# Patient Record
Sex: FEMALE | Race: BLACK OR AFRICAN AMERICAN | NOT HISPANIC OR LATINO | URBAN - METROPOLITAN AREA
[De-identification: names, ages, dates, MRNs, and addresses within clinical notes are randomized per-mention and may not be internally consistent; named-entity substitution may affect disease eponyms.]

---

## 2022-01-19 ENCOUNTER — INPATIENT (INPATIENT)
Facility: HOSPITAL | Age: 32
LOS: 0 days | Discharge: HOME | End: 2022-01-20
Attending: OBSTETRICS & GYNECOLOGY | Admitting: OBSTETRICS & GYNECOLOGY
Payer: COMMERCIAL

## 2022-01-19 VITALS
DIASTOLIC BLOOD PRESSURE: 76 MMHG | RESPIRATION RATE: 18 BRPM | SYSTOLIC BLOOD PRESSURE: 124 MMHG | TEMPERATURE: 98 F | HEART RATE: 96 BPM

## 2022-01-19 DIAGNOSIS — O26.893 OTHER SPECIFIED PREGNANCY RELATED CONDITIONS, THIRD TRIMESTER: ICD-10-CM

## 2022-01-19 DIAGNOSIS — O47.1 FALSE LABOR AT OR AFTER 37 COMPLETED WEEKS OF GESTATION: ICD-10-CM

## 2022-01-19 LAB
ABO RH CONFIRMATION: SIGNIFICANT CHANGE UP
BASOPHILS # BLD AUTO: 0.03 K/UL — SIGNIFICANT CHANGE UP (ref 0–0.2)
BASOPHILS NFR BLD AUTO: 0.3 % — SIGNIFICANT CHANGE UP (ref 0–2)
BLD GP AB SCN SERPL QL: SIGNIFICANT CHANGE UP
COVID-19 SPIKE DOMAIN AB INTERP: POSITIVE
COVID-19 SPIKE DOMAIN ANTIBODY RESULT: >250 U/ML — HIGH
EOSINOPHIL # BLD AUTO: 0.04 K/UL — SIGNIFICANT CHANGE UP (ref 0–0.5)
EOSINOPHIL NFR BLD AUTO: 0.4 % — SIGNIFICANT CHANGE UP (ref 0–6)
HBV SURFACE AB SER-ACNC: SIGNIFICANT CHANGE UP
HCT VFR BLD CALC: 39.9 % — SIGNIFICANT CHANGE UP (ref 34.5–45)
HGB BLD-MCNC: 13.8 G/DL — SIGNIFICANT CHANGE UP (ref 11.5–15.5)
HIV 1 & 2 AB SERPL IA.RAPID: SIGNIFICANT CHANGE UP
HIV 1+2 AB+HIV1 P24 AG SERPL QL IA: SIGNIFICANT CHANGE UP
IMM GRANULOCYTES NFR BLD AUTO: 1.1 % — SIGNIFICANT CHANGE UP (ref 0–1.5)
LYMPHOCYTES # BLD AUTO: 2.7 K/UL — SIGNIFICANT CHANGE UP (ref 1–3.3)
LYMPHOCYTES # BLD AUTO: 29.9 % — SIGNIFICANT CHANGE UP (ref 13–44)
MCHC RBC-ENTMCNC: 31.2 PG — SIGNIFICANT CHANGE UP (ref 27–34)
MCHC RBC-ENTMCNC: 34.6 GM/DL — SIGNIFICANT CHANGE UP (ref 32–36)
MCV RBC AUTO: 90.3 FL — SIGNIFICANT CHANGE UP (ref 80–100)
MEV IGG SER-ACNC: <5 AU/ML — SIGNIFICANT CHANGE UP
MEV IGG+IGM SER-IMP: NEGATIVE — SIGNIFICANT CHANGE UP
MONOCYTES # BLD AUTO: 0.75 K/UL — SIGNIFICANT CHANGE UP (ref 0–0.9)
MONOCYTES NFR BLD AUTO: 8.3 % — SIGNIFICANT CHANGE UP (ref 2–14)
NEUTROPHILS # BLD AUTO: 5.42 K/UL — SIGNIFICANT CHANGE UP (ref 1.8–7.4)
NEUTROPHILS NFR BLD AUTO: 60 % — SIGNIFICANT CHANGE UP (ref 43–77)
PLATELET # BLD AUTO: 153 K/UL — SIGNIFICANT CHANGE UP (ref 150–400)
RAPID RVP RESULT: SIGNIFICANT CHANGE UP
RBC # BLD: 4.42 M/UL — SIGNIFICANT CHANGE UP (ref 3.8–5.2)
RBC # FLD: 14.6 % — HIGH (ref 10.3–14.5)
RUBV IGG SER-ACNC: 3.7 INDEX — SIGNIFICANT CHANGE UP
RUBV IGG SER-IMP: POSITIVE — SIGNIFICANT CHANGE UP
SARS-COV-2 IGG+IGM SERPL QL IA: >250 U/ML — HIGH
SARS-COV-2 IGG+IGM SERPL QL IA: POSITIVE
SARS-COV-2 RNA SPEC QL NAA+PROBE: SIGNIFICANT CHANGE UP
WBC # BLD: 9.04 K/UL — SIGNIFICANT CHANGE UP (ref 3.8–10.5)
WBC # FLD AUTO: 9.04 K/UL — SIGNIFICANT CHANGE UP (ref 3.8–10.5)

## 2022-01-19 RX ORDER — IBUPROFEN 200 MG
600 TABLET ORAL EVERY 6 HOURS
Refills: 0 | Status: COMPLETED | OUTPATIENT
Start: 2022-01-19 | End: 2022-12-18

## 2022-01-19 RX ORDER — HYDROCORTISONE 1 %
1 OINTMENT (GRAM) TOPICAL EVERY 6 HOURS
Refills: 0 | Status: DISCONTINUED | OUTPATIENT
Start: 2022-01-19 | End: 2022-01-20

## 2022-01-19 RX ORDER — IBUPROFEN 200 MG
600 TABLET ORAL EVERY 6 HOURS
Refills: 0 | Status: DISCONTINUED | OUTPATIENT
Start: 2022-01-19 | End: 2022-01-20

## 2022-01-19 RX ORDER — OXYTOCIN 10 UNIT/ML
333.33 VIAL (ML) INJECTION
Qty: 20 | Refills: 0 | Status: DISCONTINUED | OUTPATIENT
Start: 2022-01-19 | End: 2022-01-20

## 2022-01-19 RX ORDER — IBUPROFEN 200 MG
1 TABLET ORAL
Qty: 40 | Refills: 0
Start: 2022-01-19 | End: 2022-01-28

## 2022-01-19 RX ORDER — SIMETHICONE 80 MG/1
80 TABLET, CHEWABLE ORAL EVERY 4 HOURS
Refills: 0 | Status: DISCONTINUED | OUTPATIENT
Start: 2022-01-19 | End: 2022-01-20

## 2022-01-19 RX ORDER — OXYCODONE HYDROCHLORIDE 5 MG/1
5 TABLET ORAL ONCE
Refills: 0 | Status: DISCONTINUED | OUTPATIENT
Start: 2022-01-19 | End: 2022-01-20

## 2022-01-19 RX ORDER — ACETAMINOPHEN 500 MG
975 TABLET ORAL
Refills: 0 | Status: DISCONTINUED | OUTPATIENT
Start: 2022-01-19 | End: 2022-01-20

## 2022-01-19 RX ORDER — BENZOCAINE 10 %
1 GEL (GRAM) MUCOUS MEMBRANE EVERY 6 HOURS
Refills: 0 | Status: DISCONTINUED | OUTPATIENT
Start: 2022-01-19 | End: 2022-01-20

## 2022-01-19 RX ORDER — LANOLIN
1 OINTMENT (GRAM) TOPICAL EVERY 6 HOURS
Refills: 0 | Status: DISCONTINUED | OUTPATIENT
Start: 2022-01-19 | End: 2022-01-20

## 2022-01-19 RX ORDER — DIBUCAINE 1 %
1 OINTMENT (GRAM) RECTAL EVERY 6 HOURS
Refills: 0 | Status: DISCONTINUED | OUTPATIENT
Start: 2022-01-19 | End: 2022-01-20

## 2022-01-19 RX ORDER — KETOROLAC TROMETHAMINE 30 MG/ML
30 SYRINGE (ML) INJECTION ONCE
Refills: 0 | Status: DISCONTINUED | OUTPATIENT
Start: 2022-01-19 | End: 2022-01-19

## 2022-01-19 RX ORDER — OXYTOCIN 10 UNIT/ML
333.33 VIAL (ML) INJECTION
Qty: 20 | Refills: 0 | Status: DISCONTINUED | OUTPATIENT
Start: 2022-01-19 | End: 2022-01-19

## 2022-01-19 RX ORDER — PRAMOXINE HYDROCHLORIDE 150 MG/15G
1 AEROSOL, FOAM RECTAL EVERY 4 HOURS
Refills: 0 | Status: DISCONTINUED | OUTPATIENT
Start: 2022-01-19 | End: 2022-01-20

## 2022-01-19 RX ORDER — OXYCODONE HYDROCHLORIDE 5 MG/1
5 TABLET ORAL
Refills: 0 | Status: DISCONTINUED | OUTPATIENT
Start: 2022-01-19 | End: 2022-01-20

## 2022-01-19 RX ORDER — AER TRAVELER 0.5 G/1
1 SOLUTION RECTAL; TOPICAL EVERY 4 HOURS
Refills: 0 | Status: DISCONTINUED | OUTPATIENT
Start: 2022-01-19 | End: 2022-01-20

## 2022-01-19 RX ORDER — MAGNESIUM HYDROXIDE 400 MG/1
30 TABLET, CHEWABLE ORAL
Refills: 0 | Status: DISCONTINUED | OUTPATIENT
Start: 2022-01-19 | End: 2022-01-20

## 2022-01-19 RX ORDER — SODIUM CHLORIDE 9 MG/ML
1000 INJECTION, SOLUTION INTRAVENOUS
Refills: 0 | Status: DISCONTINUED | OUTPATIENT
Start: 2022-01-19 | End: 2022-01-19

## 2022-01-19 RX ORDER — DIPHENHYDRAMINE HCL 50 MG
25 CAPSULE ORAL EVERY 6 HOURS
Refills: 0 | Status: DISCONTINUED | OUTPATIENT
Start: 2022-01-19 | End: 2022-01-20

## 2022-01-19 RX ORDER — SODIUM CHLORIDE 9 MG/ML
3 INJECTION INTRAMUSCULAR; INTRAVENOUS; SUBCUTANEOUS EVERY 8 HOURS
Refills: 0 | Status: DISCONTINUED | OUTPATIENT
Start: 2022-01-19 | End: 2022-01-20

## 2022-01-19 RX ORDER — ACETAMINOPHEN 500 MG
2 TABLET ORAL
Qty: 60 | Refills: 0
Start: 2022-01-19 | End: 2022-01-28

## 2022-01-19 RX ORDER — CITRIC ACID/SODIUM CITRATE 300-500 MG
30 SOLUTION, ORAL ORAL ONCE
Refills: 0 | Status: DISCONTINUED | OUTPATIENT
Start: 2022-01-19 | End: 2022-01-19

## 2022-01-19 RX ORDER — TETANUS TOXOID, REDUCED DIPHTHERIA TOXOID AND ACELLULAR PERTUSSIS VACCINE, ADSORBED 5; 2.5; 8; 8; 2.5 [IU]/.5ML; [IU]/.5ML; UG/.5ML; UG/.5ML; UG/.5ML
0.5 SUSPENSION INTRAMUSCULAR ONCE
Refills: 0 | Status: DISCONTINUED | OUTPATIENT
Start: 2022-01-19 | End: 2022-01-20

## 2022-01-19 RX ADMIN — Medication 975 MILLIGRAM(S): at 21:47

## 2022-01-19 RX ADMIN — Medication 975 MILLIGRAM(S): at 09:07

## 2022-01-19 RX ADMIN — Medication 1000 MILLIUNIT(S)/MIN: at 08:18

## 2022-01-19 RX ADMIN — Medication 600 MILLIGRAM(S): at 23:39

## 2022-01-19 RX ADMIN — Medication 30 MILLIGRAM(S): at 08:30

## 2022-01-19 RX ADMIN — Medication 600 MILLIGRAM(S): at 12:30

## 2022-01-19 RX ADMIN — Medication 600 MILLIGRAM(S): at 18:07

## 2022-01-19 RX ADMIN — SODIUM CHLORIDE 3 MILLILITER(S): 9 INJECTION INTRAMUSCULAR; INTRAVENOUS; SUBCUTANEOUS at 23:06

## 2022-01-19 RX ADMIN — Medication 30 MILLIGRAM(S): at 08:45

## 2022-01-19 RX ADMIN — Medication 975 MILLIGRAM(S): at 15:08

## 2022-01-19 RX ADMIN — Medication 975 MILLIGRAM(S): at 10:07

## 2022-01-19 RX ADMIN — SODIUM CHLORIDE 125 MILLILITER(S): 9 INJECTION, SOLUTION INTRAVENOUS at 07:30

## 2022-01-19 RX ADMIN — Medication 1 TABLET(S): at 12:30

## 2022-01-19 NOTE — OB PROVIDER DELIVERY SUMMARY - NSPROVIDERDELIVERYNOTE_OBGYN_ALL_OB_FT
Pt C/O pressure and urge to push with contractions at approx 0804. AROM performed, clear fluid. On exam, cervix FD/100% effaced.  After pushing for 10 minutes head delivered OA at 0814.  No nuchal cord noted. Bulb suctioned mouth and nares at perineum.  Body spontaneously delivered and delayed cord clamping x 30sec while bulb suctioning mouth.  Vigorous cry immediately after delivery and  pink.  Cord clamped x 2 and cut and baby to mother shortly thereafter, skin-to-skin initiated.  Segment of cord clamped x 2 and cut for umbilical cord blood gases and blood type.  Placenta delivered intact at 5:27am.  3-vessel cord, no gross pathology.  Left periurethral laceration repaired with 3-0 chromic suture. Excellent hemostasis.  EBL 236cc.  No complications.  Weir: male, APGAR 9/9, weight 3510g 7lb 12oz.  Baby and mother stable. Pt C/O pressure and urge to push with contractions at approx 0804. AROM performed, clear fluid. On exam, cervix FD/100% effaced.  After pushing for 10 minutes head delivered direct OA at 0814.  No nuchal cord noted. Bulb suctioned mouth and nares at perineum.  Body spontaneously delivered and delayed cord clamping x 30sec while bulb suctioning mouth.  Vigorous cry immediately after delivery and  pink.  Cord clamped x 2 and cut and baby to mother shortly thereafter, skin-to-skin initiated.  Segment of cord clamped x 2 and cut for umbilical cord blood gases and blood type.  Placenta delivered intact at 5:27am.  3-vessel cord, no gross pathology.  Left periurethral laceration repaired with 3-0 chromic suture. Excellent hemostasis.  EBL 236cc.  No complications.  : male, APGAR 9/9, weight 3510g 7lb 12oz.  Baby and mother stable.

## 2022-01-19 NOTE — OB PROVIDER DELIVERY SUMMARY - NSSELHIDDEN_OBGYN_ALL_OB_FT
[NS_DeliveryAttending1_OBGYN_ALL_OB_FT:[id number]==],[NS_DeliveryAssist1_OBGYN_ALL_OB_FT:[id number]=]

## 2022-01-19 NOTE — DISCHARGE NOTE OB - PROVIDER TOKENS
FREE:[LAST:[Provider],PHONE:[(   )    -],FAX:[(   )    -],ADDRESS:[Please follow up with your provider.]]

## 2022-01-19 NOTE — OB RN DELIVERY SUMMARY - NSSELHIDDEN_OBGYN_ALL_OB_FT
[NS_DeliveryAttending1_OBGYN_ALL_OB_FT:[id number]==],[NS_DeliveryAssist1_OBGYN_ALL_OB_FT:[id number]],[NS_DeliveryRN_OBGYN_ALL_OB_FT:[id number]=]

## 2022-01-19 NOTE — DISCHARGE NOTE OB - CARE PROVIDER_API CALL
Provider,   Please follow up with your provider.  Phone: (   )    -  Fax: (   )    -  Follow Up Time:

## 2022-01-19 NOTE — DISCHARGE NOTE OB - NS MD DC FALL RISK RISK
For information on Fall & Injury Prevention, visit: [email or web address] OR  [email or web address] OR  https://www.cdc.gov/steadi/patient.html

## 2022-01-19 NOTE — OB PROVIDER H&P - HISTORY OF PRESENT ILLNESS
31y  at 39.3 weeks GA by LMP who presents to L&D for contractions since 9pm last night, now increasing in frequency and intensity q 4min. Patient denies vaginal bleeding, leakage of fluid. She endorses good fetal movement. Denies fevers, chills, nausea, vomiting, chest pain, SOB, dizziness and headache. No other complaints at this time.     MARCE: 24    Prenatal course uncomplicated.      POB:  x 1, FT uncomplicated   PGYN: +fibroids, -ovarian cysts, denies STD hx, denies abnormal PAPs   PMH: Denies  PSH: Denies  SH: Denies EtOH, tobacco and illicit drug use during this pregnancy; feels safe at home   Meds: PNVs  Allergies: NKDA

## 2022-01-19 NOTE — OB RN PATIENT PROFILE - FALL HARM RISK - UNIVERSAL INTERVENTIONS
Bed in lowest position, wheels locked, appropriate side rails in place/Call bell, personal items and telephone in reach/Instruct patient to call for assistance before getting out of bed or chair/Non-slip footwear when patient is out of bed/[place] to call system/Physically safe environment - no spills, clutter or unnecessary equipment/Purposeful Proactive Rounding/Room/bathroom lighting operational, light cord in reach

## 2022-01-19 NOTE — OB PROVIDER H&P - ASSESSMENT
A/P: 30 yo  at 39.3 weeks admitted in labor  -Admit to L&D  -Consent  -Admission labs  -NPO, except ice chips   -IV fluids  -Labor: Intact, active labor  -Fetus: Cat I tracing. Continuous toco and fetal monitoring.   -GBS: unknown, no GBS ppx required as pt is 39wks    Discussed with Dr. Marlow   A/P: 30 yo  at 39.3 weeks admitted in labor  -Admit to L&D  -Consent  -Admission labs  -NPO, except ice chips   -IV fluids  -Labor: Intact, active labor  -Fetus: Cat I tracing. Continuous toco and fetal monitoring.   -GBS: found to be GBS positive on records received from [facility] after delivery of infant    Discussed with Dr. Marlow

## 2022-01-19 NOTE — DISCHARGE NOTE OB - MEDICATION SUMMARY - MEDICATIONS TO TAKE
I will START or STAY ON the medications listed below when I get home from the hospital:    ibuprofen 600 mg oral tablet  -- 1 tab(s) by mouth every 6 hours   -- Do not take this drug if you are pregnant.  It is very important that you take or use this exactly as directed.  Do not skip doses or discontinue unless directed by your doctor.  May cause drowsiness or dizziness.  Obtain medical advice before taking any non-prescription drugs as some may affect the action of this medication.  Take with food or milk.    -- Indication: For pain    Tylenol 325 mg oral tablet  -- 2 tab(s) by mouth every 8 hours   -- This product contains acetaminophen.  Do not use  with any other product containing acetaminophen to prevent possible liver damage.    -- Indication: For pain

## 2022-01-19 NOTE — OB PROVIDER H&P - NSHPPHYSICALEXAM_GEN_ALL_CORE
T(C): 36.6 (01-19-22 @ 06:46), Max: 36.6 (01-19-22 @ 06:46)  HR: 96 (01-19-22 @ 06:53) (96 - 96)  BP: 124/76 (01-19-22 @ 06:53) (124/76 - 124/76)  RR: 18 (01-19-22 @ 06:46) (18 - 18)      Gen: NAD, well-appearing, AAOx3   Abd: Soft, gravid  Ext: non-tender, non-edematous  SVE:  8.5/100/-1, intact  Bedside sono: vertex  FHT: 130bpm  [place]: contraction q 2-4 min

## 2022-01-19 NOTE — DISCHARGE NOTE OB - PATIENT PORTAL LINK FT
You can access the FollowMyHealth Patient Portal offered by [facility] by registering at the following website: [email or web address]. By joining [employer]’s FollowMyHealth portal, you will also be able to view your health information using other applications (apps) compatible with our system.

## 2022-01-19 NOTE — OB RN DELIVERY SUMMARY - NS_SEPSISRSKCALC_OBGYN_ALL_OB_FT
EOS calculated successfully. EOS Risk Factor: 0.5/1000 live births ([facility] national incidence); GA=39w3d; Temp=98.4; ROM=0.233; GBS='Unknown'; Antibiotics='No antibiotics or any antibiotics < 2 hrs prior to birth'

## 2022-01-19 NOTE — OB RN TRIAGE NOTE - FALL HARM RISK - UNIVERSAL INTERVENTIONS
Bed in lowest position, wheels locked, appropriate side rails in place/Call bell, personal items and telephone in reach/Instruct patient to call for assistance before getting out of bed or chair/Non-slip footwear when patient is out of bed/[place] to call system/Physically safe environment - no spills, clutter or unnecessary equipment/Purposeful Proactive Rounding/Room/bathroom lighting operational, light cord in reach

## 2022-01-19 NOTE — DISCHARGE NOTE OB - CARE PLAN
Principal Discharge DX:	Vaginal delivery  Assessment and plan of treatment:	1) Please take Ibuprofen as needed for pain control  2) Nothing in the vagina for 6 weeks (including no sex, no tampons, and no douching).  3) Please call your doctor for a follow up your postpartum appointment in 4-6 weeks.  4) Please continue taking vitamins postpartum. Take iron and colace for acute blood loss anemia.  5) Please call the office sooner if you have heavy vaginal bleeding, severe abdominal pain, or fever > 100.4F.  6) You may resume regular activity as tolerated   1

## 2022-01-20 VITALS
DIASTOLIC BLOOD PRESSURE: 68 MMHG | HEART RATE: 77 BPM | SYSTOLIC BLOOD PRESSURE: 115 MMHG | TEMPERATURE: 98 F | RESPIRATION RATE: 18 BRPM | OXYGEN SATURATION: 98 %

## 2022-01-20 LAB
HCT VFR BLD CALC: 34 % — LOW (ref 34.5–45)
HGB BLD-MCNC: 11.7 G/DL — SIGNIFICANT CHANGE UP (ref 11.5–15.5)
T PALLIDUM AB TITR SER: NEGATIVE — SIGNIFICANT CHANGE UP

## 2022-01-20 PROCEDURE — 86706 HEP B SURFACE ANTIBODY: CPT

## 2022-01-20 PROCEDURE — 0225U NFCT DS DNA&RNA 21 SARSCOV2: CPT

## 2022-01-20 PROCEDURE — 59050 FETAL MONITOR W/REPORT: CPT

## 2022-01-20 PROCEDURE — G0463: CPT

## 2022-01-20 PROCEDURE — 86780 TREPONEMA PALLIDUM: CPT

## 2022-01-20 PROCEDURE — 59025 FETAL NON-STRESS TEST: CPT

## 2022-01-20 PROCEDURE — 86703 HIV-1/HIV-2 1 RESULT ANTBDY: CPT

## 2022-01-20 PROCEDURE — 86762 RUBELLA ANTIBODY: CPT

## 2022-01-20 PROCEDURE — 86900 BLOOD TYPING SEROLOGIC ABO: CPT

## 2022-01-20 PROCEDURE — 87389 HIV-1 AG W/HIV-1&-2 AB AG IA: CPT

## 2022-01-20 PROCEDURE — 85018 HEMOGLOBIN: CPT

## 2022-01-20 PROCEDURE — 86769 SARS-COV-2 COVID-19 ANTIBODY: CPT

## 2022-01-20 PROCEDURE — 36415 COLL VENOUS BLD VENIPUNCTURE: CPT

## 2022-01-20 PROCEDURE — 86850 RBC ANTIBODY SCREEN: CPT

## 2022-01-20 PROCEDURE — 86765 RUBEOLA ANTIBODY: CPT

## 2022-01-20 PROCEDURE — 90707 MMR VACCINE SC: CPT

## 2022-01-20 PROCEDURE — 85014 HEMATOCRIT: CPT

## 2022-01-20 PROCEDURE — 85025 COMPLETE CBC W/AUTO DIFF WBC: CPT

## 2022-01-20 PROCEDURE — 86901 BLOOD TYPING SEROLOGIC RH(D): CPT

## 2022-01-20 RX ADMIN — Medication 600 MILLIGRAM(S): at 12:15

## 2022-01-20 RX ADMIN — Medication 0.5 MILLILITER(S): at 09:23

## 2022-01-20 RX ADMIN — Medication 1 TABLET(S): at 12:15

## 2022-01-20 RX ADMIN — Medication 975 MILLIGRAM(S): at 08:12

## 2022-01-20 RX ADMIN — Medication 600 MILLIGRAM(S): at 17:15

## 2022-01-20 RX ADMIN — Medication 975 MILLIGRAM(S): at 03:11

## 2022-01-20 NOTE — PROGRESS NOTE ADULT - ASSESSMENT
Post Partum Progress Note: Vaginal delivery     HANNAH WHARTON is a 31y  s/p vaginal delivery with periurethral repair PPD# 1  FEMALE INFANT    Patient was seen and examined at bedside     SUBJECTIVE:  No acute events overnight per nursing  Reports feeling well this morning  Pain is well controlled with PRN pain medication  Tolerating PO without N/V  No flatus  No BM  Voiding spontaneously  Ambulating without assistance  Reports minimal lochia which is decreasing  She is not yet breastfeeding stating that she has no milk supply yet. Intends to breastfeed at home, spoke with lactation consult  Denies fevers, chills, shortness of breath, chest pain, headaches, vision changes or calf pain      OBJECTIVE:  Physical exam:  General: AOx3, NAD.  Heart: RRR. S1S2.  Lungs: CTABL. Good airflow bilaterally.   Abdomen: +BS, Soft, appropriately tender, nondistended, no guarding or rebound tenderness, firm uterine fundus at umbilicus  Ext: No DVT signs, warm extremities.    Vital Signs Last 24 Hrs  T(C): 36.7 (2022 04:04), Max: 37.1 (2022 11:00)  T(F): 98.1 (2022 04:04), Max: 98.7 (2022 11:00)  HR: 77 (2022 04:04) (74 - 96)  BP: 115/68 (2022 04:04) (115/68 - 131/76)  BP(mean): --  RR: 18 (2022 04:04) (16 - 18)  SpO2: 98% (2022 04:04) (97% - 98%)    LABS:                        13.8   9.04  )-----------( 153      ( 2022 08:03 )             39.9       Antibody Screen: NEG (22 @ 08:20)      acetaminophen     Tablet .. 975 milliGRAM(s) Oral <User Schedule>  benzocaine 20%/menthol 0.5% Spray 1 Spray(s) Topical every 6 hours PRN  dibucaine 1% Ointment 1 Application(s) Topical every 6 hours PRN  diphenhydrAMINE 25 milliGRAM(s) Oral every 6 hours PRN  diphtheria/tetanus/pertussis (acellular) Vaccine (ADAcel) 0.5 milliLiter(s) IntraMuscular once  hydrocortisone 1% Cream 1 Application(s) Topical every 6 hours PRN  ibuprofen  Tablet. 600 milliGRAM(s) Oral every 6 hours  lanolin Ointment 1 Application(s) Topical every 6 hours PRN  magnesium hydroxide Suspension 30 milliLiter(s) Oral two times a day PRN  measles/mumps/rubella Vaccine 0.5 milliLiter(s) SubCutaneous once  oxyCODONE    IR 5 milliGRAM(s) Oral once PRN  oxyCODONE    IR 5 milliGRAM(s) Oral every 3 hours PRN  oxytocin Infusion 333.333 milliUNIT(s)/Min IV Continuous <Continuous>  pramoxine 1%/zinc 5% Cream 1 Application(s) Topical every 4 hours PRN  prenatal multivitamin 1 Tablet(s) Oral daily  simethicone 80 milliGRAM(s) Chew every 4 hours PRN  sodium chloride 0.9% lock flush 3 milliLiter(s) IV Push every 8 hours  witch hazel Pads 1 Application(s) Topical every 4 hours PRN        ASSESSMENT:   HANNAH WHARTON is 31y  s/p   PPD#1  Female infant .  Patient has no complaints at this time.  Rh status +  Rubella/Rubeola: Immune   Hgb 13.8 ->AM CBC  VSS.     #Postpartum   - Continue routine post-partum care  - Tolerating regular diet  - Encourage maternal- bonding  - Encourage ambulation  - Dispo: plan for discharge likely PPD#1 pending Attending's approval   Post Partum Progress Note: Vaginal delivery     HANNAH WHARTON is a 31y  s/p vaginal delivery with periurethral repair PPD# 1  FEMALE INFANT    Patient was seen and examined at bedside     SUBJECTIVE:  No acute events overnight per nursing  Reports feeling well this morning  Pain is well controlled with PRN pain medication  Tolerating PO without N/V  No flatus  No BM  Voiding spontaneously  Ambulating without assistance  Reports minimal lochia which is decreasing  She is not yet breastfeeding stating that she has no milk supply yet. Intends to breastfeed at home, spoke with lactation consult  Denies fevers, chills, shortness of breath, chest pain, headaches, vision changes or calf pain      OBJECTIVE:  Physical exam:  General: AOx3, NAD.  Heart: RRR. S1S2.  Lungs: CTABL. Good airflow bilaterally.   Abdomen: +BS, Soft, appropriately tender, nondistended, no guarding or rebound tenderness, firm uterine fundus at umbilicus  Ext: No DVT signs, warm extremities.    Vital Signs Last 24 Hrs  T(C): 36.7 (2022 04:04), Max: 37.1 (2022 11:00)  T(F): 98.1 (2022 04:04), Max: 98.7 (2022 11:00)  HR: 77 (2022 04:04) (74 - 96)  BP: 115/68 (2022 04:04) (115/68 - 131/76)  BP(mean): --  RR: 18 (2022 04:04) (16 - 18)  SpO2: 98% (2022 04:04) (97% - 98%)    LABS:                        13.8   9.04  )-----------( 153      ( 2022 08:03 )             39.9       Antibody Screen: NEG (22 @ 08:20)      acetaminophen     Tablet .. 975 milliGRAM(s) Oral <User Schedule>  benzocaine 20%/menthol 0.5% Spray 1 Spray(s) Topical every 6 hours PRN  dibucaine 1% Ointment 1 Application(s) Topical every 6 hours PRN  diphenhydrAMINE 25 milliGRAM(s) Oral every 6 hours PRN  diphtheria/tetanus/pertussis (acellular) Vaccine (ADAcel) 0.5 milliLiter(s) IntraMuscular once  hydrocortisone 1% Cream 1 Application(s) Topical every 6 hours PRN  ibuprofen  Tablet. 600 milliGRAM(s) Oral every 6 hours  lanolin Ointment 1 Application(s) Topical every 6 hours PRN  magnesium hydroxide Suspension 30 milliLiter(s) Oral two times a day PRN  measles/mumps/rubella Vaccine 0.5 milliLiter(s) SubCutaneous once  oxyCODONE    IR 5 milliGRAM(s) Oral once PRN  oxyCODONE    IR 5 milliGRAM(s) Oral every 3 hours PRN  oxytocin Infusion 333.333 milliUNIT(s)/Min IV Continuous <Continuous>  pramoxine 1%/zinc 5% Cream 1 Application(s) Topical every 4 hours PRN  prenatal multivitamin 1 Tablet(s) Oral daily  simethicone 80 milliGRAM(s) Chew every 4 hours PRN  sodium chloride 0.9% lock flush 3 milliLiter(s) IV Push every 8 hours  witch hazel Pads 1 Application(s) Topical every 4 hours PRN        ASSESSMENT:   HANNAH WHARTON is 31y  s/p   PPD#1  Female infant  Patient has no complaints at this time.  Hgb 13.8 ->AM CBC  VSS.     #Postpartum   - Continue routine post-partum care  - Tolerating regular diet  - Encourage maternal- bonding  - Encourage ambulation  - Dispo: plan for discharge likely PPD#1 pending Attending's approval       ASSESSMENT:   HANNAH WHARTON is a 31y  s/p vaginal delivery with periurethral repair PPD# 1 at 39weeks; presented in labor  FEMALE INFANT  Patient has no complaints at this time.  Hgb 13.8 ->pending AM CBC  VSS.     #Postpartum   - Continue routine post-partum care  - Tolerating regular diet  - Encourage maternal- bonding  - Encourage ambulation  - Dispo: plan for discharge likely PPD#1 pending Attending's approval

## 2022-01-20 NOTE — PROGRESS NOTE ADULT - SUBJECTIVE AND OBJECTIVE BOX
Post Partum Progress Note: Vaginal delivery     HANNAH WHARTON is a 31y  s/p vaginal delivery with periurethral repair PPD# 1 at 39weeks; presented in labor  FEMALE INFANT    Patient was seen and examined at bedside     SUBJECTIVE:  No acute events overnight per nursing  Reports feeling well this morning  Pain is well controlled with PRN pain medication  Tolerating PO without N/V  +flatus  No BM  Voiding spontaneously  Ambulating without assistance  Reports minimal lochia which is decreasing  She is not yet breastfeeding stating that she has no milk supply yet. Intends to breastfeed at home, spoke with lactation consult  Denies fevers, chills, shortness of breath, chest pain, headaches, vision changes or calf pain      OBJECTIVE:  Physical exam:  General: AOx3, NAD.  Heart: RRR. S1S2.  Lungs: CTABL. Good airflow bilaterally.   Abdomen: +BS, Soft, appropriately tender, nondistended, no guarding or rebound tenderness, firm uterine fundus at umbilicus  Ext: No DVT signs, warm extremities.    Vital Signs Last 24 Hrs  T(C): 36.7 (2022 04:04), Max: 37.1 (2022 11:00)  T(F): 98.1 (2022 04:04), Max: 98.7 (2022 11:00)  HR: 77 (2022 04:04) (74 - 96)  BP: 115/68 (2022 04:04) (115/68 - 131/76)  RR: 18 (2022 04:04) (16 - 18)  SpO2: 98% (2022 04:04) (97% - 98%)    LABS:                        13.8   9.04  )-----------( 153      ( 2022 08:03 )             39.9       Antibody Screen: NEG (22 @ 08:20)

## 2022-06-10 NOTE — OB RN PATIENT PROFILE - NSNAMEOFMDOFFICE_OBGYN_ALL_OB_FT
I called and spoke to patient regarding results  Patient verbalized understanding   Thank you [facility]

## 2023-09-16 NOTE — OB PROVIDER H&P - ATTENDING COMMENTS
show
As above, 31yr old  at 39.3wks gestation admitted in active labor, cervix 8-9cm dilated.  FHR tracing Cat 1.  Will try to obtain med records, prenatal course complicated only by known large fibroid uterus as per pt.  Will cont with expectant management.

## 2024-02-14 NOTE — OB RN PATIENT PROFILE - COMPLETE THE FOLLOWING IF THE PATIENT REFUSES THE INFLUENZA VACCINE:
51 Risks/benefits discussed with patient/surrogate/Vaccine Information Sheet (VIS) provided-VIS date: 8/6/21
